# Patient Record
Sex: MALE
[De-identification: names, ages, dates, MRNs, and addresses within clinical notes are randomized per-mention and may not be internally consistent; named-entity substitution may affect disease eponyms.]

---

## 2018-08-31 ENCOUNTER — HOSPITAL ENCOUNTER (INPATIENT)
Dept: HOSPITAL 31 - C.ER | Age: 53
LOS: 3 days | Discharge: LEFT BEFORE DISCHARGE | DRG: 885 | End: 2018-09-03
Attending: PSYCHIATRY & NEUROLOGY | Admitting: PSYCHIATRY & NEUROLOGY
Payer: SELF-PAY

## 2018-08-31 DIAGNOSIS — F10.10: ICD-10-CM

## 2018-08-31 DIAGNOSIS — Z91.5: ICD-10-CM

## 2018-08-31 DIAGNOSIS — F33.2: Primary | ICD-10-CM

## 2018-08-31 PROCEDURE — GZ56ZZZ INDIVIDUAL PSYCHOTHERAPY, SUPPORTIVE: ICD-10-PCS | Performed by: PSYCHIATRY & NEUROLOGY

## 2018-08-31 PROCEDURE — GZ58ZZZ INDIVIDUAL PSYCHOTHERAPY, COGNITIVE-BEHAVIORAL: ICD-10-PCS | Performed by: PSYCHIATRY & NEUROLOGY

## 2018-08-31 NOTE — PCM.BM
Treatment Plan Problems





- Problems identified on initial assessmt


  ** Depression


Date Initiated: 08/31/18


Time Initiated: 22:30


Assessment reference: NA


Status: Active





Treatment assets and liabiliti


Patient Liabilities: substance abuse (Barbiturates)





- Milieu Protocol


Maintain good personal hygiene: daily Encourage regular showers, daily Remind 

patient to perform daily oral care, every shift Assist patient to perform ADL's


Conduct patient checks and document Observation sheet: Q15 minutes


Maintain personal safety: every shift Educate patient to report safety concerns 

to staff, every shift Monitor environment for contraband/sharps


Medication safety: Monitor for expected outcome, potential side effects: every 

shift, Assess barriers to learning: every shift, Assess readiness for 

medication education: every shift

## 2018-08-31 NOTE — C.PDOC
History Of Present Illness


52 y/o male, BIBA as a transfer from [facility], presents to the ED for 

admission for depression. The patient offers no medical complaints. 


Time Seen by Provider: 18 21:44


Chief Complaint (Nursing): Psychiatric Evaluation


History Per: Patient


History/Exam Limitations: no limitations


Onset/Duration Of Symptoms: Hrs


Current Symptoms Are (Timing): Still Present


Recent travel outside of the [place]: No


Additional History Per: EMS





Past Medical History


Reviewed: Historical Data, Nursing Documentation, Vital Signs


Vital Signs: 


 Last Vital Signs











Temp  97.8 F   18 21:40


 


Pulse  73   18 21:40


 


Resp  16   18 21:40


 


BP  146/91 H  18 21:40


 


Pulse Ox  97   18 21:59














- Medical History


PMH: No Chronic Diseases


Other Surgeries: stomach surgery


Family History: States: Unknown Family Hx





- Social History


Hx Alcohol Use: No


Hx Substance Use: No (pt denies)





Review Of Systems


Except As Marked, All Systems Reviewed And Found Negative.


Constitutional: Negative for: Fever, Chills


Psych: Positive for: Depression





Physical Exam





- Physical Exam


Appears: Well, Non-toxic, No Acute Distress


Skin: Normal Color, Warm, Dry


Head: Atraumatic, Normacephalic


Eye(s): bilateral: PERRL, EOMI


Ear(s): Bilateral: Normal


Oral Mucosa: Moist


Neck: Supple


Chest: Symmetrical


Cardiovascular: Rhythm Regular, No Murmur


Respiratory: Normal Breath Sounds, No Rales, No Rhonchi, No Wheezing


Gastrointestinal/Abdominal: Soft, No Tenderness, No Distention


Extremity: Normal ROM


Neurological/Psych: Oriented x3, Normal Speech


Gait: Steady





ED Course And Treatment


O2 Sat by Pulse Oximetry: 97 (RA)


Pulse Ox Interpretation: Normal





Medical Decision Making


Medical Decision Makin- Admission to Dr. Rae





Disposition





- Disposition


Disposition: HOSPITALIZED


Disposition Time: 21:45


Condition: STABLE





- Clinical Impression


Clinical Impression: 


 Depression








- PA / NP / Resident Statement


MD/DO has reviewed & agrees with the documentation as recorded.





- Scribe Statement


The provider has reviewed the documentation as recorded by the Scribe (Niki Edmond)


Provider Attestation: 


All medical record entries made by the Scribe were at my direction and 

personally dictated by me. I have reviewed the chart and agree that the record 

accurately reflects my personal performance of the history, physical exam, 

medical decision making, and the department course for this patient. I have 

also personally directed, reviewed, and agree with the discharge instructions 

and disposition.

## 2018-09-01 RX ADMIN — Medication SCH: at 18:07
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
## 2018-09-02 VITALS — RESPIRATION RATE: 20 BRPM

## 2018-09-02 RX ADMIN — Medication SCH: at 10:25

## 2018-09-03 VITALS
HEART RATE: 80 BPM | SYSTOLIC BLOOD PRESSURE: 107 MMHG | OXYGEN SATURATION: 97 % | DIASTOLIC BLOOD PRESSURE: 72 MMHG | TEMPERATURE: 97.5 F

## 2018-09-03 RX ADMIN — Medication SCH TAB: at 09:04

## 2018-09-03 NOTE — PCM.PYCHDC
Mental Status Examination





- Mental Status Examination


Orientation: Person, Place, Situation, Time


Memory: Intact


Mood: Depressed (less), Anxious


Affect: Constricted


Speech: Appropriate


Attention: WNL


Concentration: WNL


Association: WNL


Fund of Knowledge: WNL


Formal Thought Process: No Impairment


Suicidal Ideation: No


Current Homicidal Ideation?: No





Discharge Summary





- Discharge Note


Reason for Hospitalization: 





Depression, SI, OD


Consultations:: List each consultation separately and include:  1. Reason for 

request.  2. Findings.  3. Follow-up


Summary of Hospital Course include:: 1. Description of specific treatment plan 

utilized for patients during their course of treatmen.  2. Summarize the time-

course for resolution of acute symptoms and/or regressed behaviors.  3. 

Describe issues identified and worked on during hospitalization.  4. Describe 

medication utilized.  5. Describe medical problems identified and treated.  6. 

Reassessment of suicide risk


Summary of Hospital Course: 








Seen with Syriac speaking RN


We called her wife from different phones more than 8 times but she did not pick 

up


The pt was admitted and started on treatment with psychotherapy, support, 

psychoeducation and medications. 


MI used


All the risks and benefits of medications are discussed and the patient 

understood and agreed.


The pt improved with the treatments provided. However he asked to leave AMA b/c 

he said he was better and wanted to move on with his life, ie return to  if 

he cannot be with wife. He said he would stay with a friend, who he was in 

touch. He was future-oriented and much calmer upon d/c. He voiced no SI or HI 

since admission


After care discussed with the patient. He will return to [facility] outpt program


He only agreed to take Remeron at night.








- Final Diagnosis (DSM 5)


Condition upon Discharge: IMPROVED


DSM 5: 





Major depressive d/o - recurrent, severe, w/o psychosis


Alcohol use d/o - moderate


Personality d/o - severe


Disposition: AGAINST MEDICAL ADVICE


Follow-up Treatment Plan: 


Continue below medications after discharge.


Follow after care plan as discussed.


Use relapse prevention skills


Return to ER or call 911 if suicidal, homicidal or symptoms relapse.


Stay away from stress, alcohol and drugs.


See primary doctor regularly and get labs. 


Stay away from wife if she refuses contact or gotten an OOP - he agreed








Prescriptions/Medication Reconciliation: 


Mirtazapine [Remeron] 15 mg PO HS #30 tab